# Patient Record
Sex: FEMALE | Race: OTHER | NOT HISPANIC OR LATINO | URBAN - METROPOLITAN AREA
[De-identification: names, ages, dates, MRNs, and addresses within clinical notes are randomized per-mention and may not be internally consistent; named-entity substitution may affect disease eponyms.]

---

## 2019-04-10 ENCOUNTER — EMERGENCY (EMERGENCY)
Facility: HOSPITAL | Age: 34
LOS: 1 days | Discharge: HOME | End: 2019-04-10
Attending: EMERGENCY MEDICINE | Admitting: EMERGENCY MEDICINE
Payer: COMMERCIAL

## 2019-04-10 VITALS
SYSTOLIC BLOOD PRESSURE: 110 MMHG | OXYGEN SATURATION: 97 % | DIASTOLIC BLOOD PRESSURE: 74 MMHG | RESPIRATION RATE: 17 BRPM | HEART RATE: 82 BPM

## 2019-04-10 VITALS
HEART RATE: 90 BPM | DIASTOLIC BLOOD PRESSURE: 70 MMHG | TEMPERATURE: 98 F | RESPIRATION RATE: 18 BRPM | WEIGHT: 113.98 LBS | OXYGEN SATURATION: 96 % | SYSTOLIC BLOOD PRESSURE: 99 MMHG

## 2019-04-10 DIAGNOSIS — R41.82 ALTERED MENTAL STATUS, UNSPECIFIED: ICD-10-CM

## 2019-04-10 DIAGNOSIS — R42 DIZZINESS AND GIDDINESS: ICD-10-CM

## 2019-04-10 DIAGNOSIS — Z79.899 OTHER LONG TERM (CURRENT) DRUG THERAPY: ICD-10-CM

## 2019-04-10 PROCEDURE — 99283 EMERGENCY DEPT VISIT LOW MDM: CPT | Mod: 25

## 2019-04-10 PROCEDURE — 99284 EMERGENCY DEPT VISIT MOD MDM: CPT | Mod: 25

## 2019-04-10 PROCEDURE — 93005 ELECTROCARDIOGRAM TRACING: CPT

## 2019-04-10 PROCEDURE — 93010 ELECTROCARDIOGRAM REPORT: CPT | Mod: 59

## 2019-04-10 PROCEDURE — 82962 GLUCOSE BLOOD TEST: CPT

## 2019-04-10 NOTE — ED ADULT NURSE NOTE - OBJECTIVE STATEMENT
pt brought in with  for eval of dizziness/ memory loss tonight. Pt reports she went out for drinks with friends and doesn't remember the drive home so she called her  and he felt she was more out of sorts than normal when drinking. No facial droop or unilateral weakness. Denies drug use.     no dizziness, blurred vision at present, non neuro deficits, NIHSS=0, spouse at bedside, pt. states spouse just wanted her to get checked

## 2019-04-10 NOTE — ED PROVIDER NOTE - NSFOLLOWUPINSTRUCTIONS_ED_ALL_ED_FT
WHAT YOU NEED TO KNOW:    What is alcohol intoxication? Alcohol intoxication is a harmful physical condition caused when you drink more alcohol than your body can handle. It is also called ethanol poisoning, or being drunk.    What do I need to know about recommended alcohol limits?     Men 21 to 64 years should limit alcohol to 2 drinks a day. Do not have more than 4 drinks in 1 day or more than 14 in 1 week.      All women, and men 65 or older should limit alcohol to 1 drink in a day. Do not have more than 3 drinks in 1 day or more than 7 in 1 week. No amount of alcohol is okay during pregnancy.    What are common signs and symptoms of alcohol intoxication?     Breath that smells like alcohol    Blackouts or seizures    Enlarged pupils, or eye movements that are faster than normal for you    Fast heartbeat and slow breaths    Loss of balance, or no ability to walk straight or stand still    Nausea and vomiting    Slurred or loud speech    Quick mood changes    Trouble at work or school, or risky behavior, such as unprotected sex or driving while intoxicated

## 2019-04-10 NOTE — ED ADULT TRIAGE NOTE - CHIEF COMPLAINT QUOTE
pt brought in with  for eval of dizziness/ memory loss tonight. Pt reports she went out for drinks with friends and doesn't remember the drive home so she called her  and he felt she was more out of sorts than normal when drinking. No facial droop or unilateral weakness. Denies drug use.

## 2019-04-10 NOTE — ED ADULT NURSE NOTE - NSIMPLEMENTINTERV_GEN_ALL_ED
Implemented All Universal Safety Interventions:  [place] to call system. Call bell, personal items and telephone within reach. Instruct patient to call for assistance. Room bathroom lighting operational. Non-slip footwear when patient is off stretcher. Physically safe environment: no spills, clutter or unnecessary equipment. Stretcher in lowest position, wheels locked, appropriate side rails in place.

## 2019-04-10 NOTE — ED PROVIDER NOTE - CLINICAL SUMMARY MEDICAL DECISION MAKING FREE TEXT BOX
etoh intox, felt confused upon getting home and dizzy, currently asymptomatic.  no focal neuro deficits.  at bedside. offered labs and imaging, however pt declined.  no evidence of trauma.  alerted to dangers of excessive alcohol ingestion.

## 2019-04-10 NOTE — ED PROVIDER NOTE - OBJECTIVE STATEMENT
33F hx depression, c/o feeling unwell tonight. pt  was out with a friend at dinner and was drinking.  did not remember cab ride home and upon arriving home felt dizzy.  states just didn't feel like herself. no vomiting, no HA. no focal numbness/weakness. no chest pain, no SOB. no abd pain.  denies falls or head injury.  states now feeling much better,  no blurry vision.  takes wellbutrin and lexapro daily.

## 2019-10-26 NOTE — ED PROVIDER NOTE - NS_EDPROVIDERDISPOUSERTYPE_ED_A_ED
Patient awake alert  Tolerating clear liquids  Had Jell-O but developed worsening abdominal pain  Pain midepigastric area  No radiation to shoulder  Vital signs stable  O2 sats better on O2    Physical exam:  Patient appears awake and alert in no apparent distress  Abdomen flat firm tender in the epigastric area  Mild guarding noted  No mass present  Impression:  Duodenitis/jejunitis  Duodenal diverticulitis? Pancreatitis? Plan: On empiric antibiotics  Pain control  Slowly advance diet as tolerated  Will follow with you  Attending Attestation (For Attendings USE Only)...